# Patient Record
Sex: FEMALE | Race: WHITE | NOT HISPANIC OR LATINO | Employment: OTHER | URBAN - NONMETROPOLITAN AREA
[De-identification: names, ages, dates, MRNs, and addresses within clinical notes are randomized per-mention and may not be internally consistent; named-entity substitution may affect disease eponyms.]

---

## 2024-11-12 ENCOUNTER — OFFICE VISIT (OUTPATIENT)
Dept: SURGERY | Facility: CLINIC | Age: 54
End: 2024-11-12
Payer: COMMERCIAL

## 2024-11-12 ENCOUNTER — HOSPITAL ENCOUNTER (OUTPATIENT)
Dept: ULTRASOUND IMAGING | Facility: HOSPITAL | Age: 54
Discharge: HOME | End: 2024-11-12
Payer: COMMERCIAL

## 2024-11-12 VITALS
WEIGHT: 293 LBS | SYSTOLIC BLOOD PRESSURE: 104 MMHG | HEIGHT: 67 IN | BODY MASS INDEX: 45.99 KG/M2 | DIASTOLIC BLOOD PRESSURE: 72 MMHG

## 2024-11-12 DIAGNOSIS — K83.8 COMMON BILE DUCT DILATATION: ICD-10-CM

## 2024-11-12 DIAGNOSIS — R10.11 RUQ PAIN: Primary | ICD-10-CM

## 2024-11-12 DIAGNOSIS — R10.11 RUQ PAIN: ICD-10-CM

## 2024-11-12 DIAGNOSIS — K82.8 PORCELAIN GALLBLADDER: ICD-10-CM

## 2024-11-12 PROCEDURE — 76705 ECHO EXAM OF ABDOMEN: CPT | Performed by: RADIOLOGY

## 2024-11-12 PROCEDURE — 76705 ECHO EXAM OF ABDOMEN: CPT

## 2024-11-12 RX ORDER — ONDANSETRON 4 MG/1
TABLET, FILM COATED ORAL
COMMUNITY
Start: 2024-11-08

## 2024-11-12 RX ORDER — DICYCLOMINE HYDROCHLORIDE 10 MG/1
10 CAPSULE ORAL
COMMUNITY

## 2024-11-12 NOTE — PROGRESS NOTES
Subjective   Victoria Carlson is a 54 y.o. female who presents today for Initial Evaluation    Chief Complaint:    Chief Complaint   Patient presents with    Abdominal Pain        History of Present Illness:    History of Present Illness Victoria is a 54-year-old female who presents for evaluation for abdominal pain.  She reports approximately 3 weeks ago she developed right upper quadrant abdominal pain as well as occasional nausea, vomiting and diarrhea.  Reports that she was evaluated at [facility] and was told that her gallbladder was big.  They prescribed her Bentyl.  Suggested she follow-up with surgery.  Patient has not had any abdominal surgeries in the past.  She does not correlate her diet with any symptoms.  She reports that she has been pain-free over the past 2 to 3 days.  Reports currently her abdomen is nontender.  She denies any nausea, vomiting, fever or chills.  She does report she originally had a fever and chills when she presented to the emergency department at [facility].  She was prescribed Bentyl and states that it helped, however, it gives her a headache.    The following portions of the patient's history were reviewed and updated as appropriate: allergies, current medications, past family history, past medical history, past social history, past surgical history and problem list.    Past Medical History:  History reviewed. No pertinent past medical history.    Social History:  Social History     Socioeconomic History    Marital status:    Tobacco Use    Smoking status: Never     Passive exposure: Never    Smokeless tobacco: Never   Vaping Use    Vaping status: Never Used   Substance and Sexual Activity    Alcohol use: Yes     Alcohol/week: 2.0 standard drinks of alcohol     Types: 2 Drinks containing 0.5 oz of alcohol per week     Comment: bi-monthly    Drug use: Never    Sexual activity: Defer       Family History:  History reviewed. No pertinent family history.    Past Surgical  "History:  History reviewed. No pertinent surgical history.    Problem List:  There is no problem list on file for this patient.      Allergy:   No Known Allergies     Current Medications:   Current Outpatient Medications   Medication Sig Dispense Refill    ondansetron (ZOFRAN) 4 MG tablet TAKE ONE TABLET BY MOUTH EVERY 6 HOURS AS NEEDED FOR NAUSEA/VOMITING      dicyclomine (BENTYL) 10 MG capsule Take 1 capsule by mouth 4 (Four) Times a Day Before Meals & at Bedtime.       No current facility-administered medications for this visit.       Review of Systems:    Review of Systems   Gastrointestinal:  Positive for abdominal pain (resolved), nausea (resolved) and vomiting (resolved).     Physical Exam:   Physical Exam  Constitutional:       Appearance: Normal appearance.   HENT:      Head: Normocephalic and atraumatic.      Right Ear: External ear normal.      Left Ear: External ear normal.   Eyes:      Conjunctiva/sclera: Conjunctivae normal.   Pulmonary:      Effort: Pulmonary effort is normal.   Abdominal:      General: Abdomen is flat. There is no distension.      Tenderness: There is no abdominal tenderness.   Musculoskeletal:         General: Normal range of motion.      Cervical back: Normal range of motion and neck supple.   Skin:     General: Skin is warm and dry.      Capillary Refill: Capillary refill takes less than 2 seconds.   Neurological:      General: No focal deficit present.      Mental Status: She is alert and oriented to person, place, and time.   Psychiatric:         Mood and Affect: Mood normal.         Behavior: Behavior normal.         Vitals:  Blood pressure 104/72, height 170.2 cm (67\"), weight (!) 166 kg (367 lb).   Body mass index is 57.48 kg/m².     Labs: I have reviewed lab work from [facility] that was obtained on 11/8.  Patient's total bilirubin was normal at 0.7.  AST was normal at 26.  And ALT was normal at 22.    Imaging: I was able to review reports from [facility] " that revealed coarse calcification of the gallbladder fundus.    I have also reviewed ultrasound report that I ordered today.  The impression reveals a prominent common bile duct is measuring 1 cm in diameter, there is no bile duct stone seen.  Also revealed some calcifications within the gallbladder wall, representing a possible porcelain gallbladder.       Assessment & Plan   Diagnoses and all orders for this visit:    1. RUQ pain (Primary)  -     US Gallbladder; Future  -     Cancel: MRI abdomen wo contrast mrcp; Future  -     MRI abdomen w contrast mrcp; Future    2. Porcelain gallbladder  -     Cancel: MRI abdomen wo contrast mrcp; Future  -     MRI abdomen w contrast mrcp; Future    3. Common bile duct dilatation  -     Cancel: MRI abdomen wo contrast mrcp; Future  -     MRI abdomen w contrast mrcp; Future    Victoria is a 54-year-old female who presents for evaluation for right upper quadrant abdominal pain.  I did receive patient's CT imaging report after she left the clinic.  I sent her down for a ultrasound of her gallbladder.  It seems that patient more than likely has a porcelain gallbladder.  She does have a normal total bilirubin as well as normal LFTs on her most recent labs for around [facility] which were on 11/8.  I did discuss case with Dr. Toussaint who suggested performing MRCP to rule out stone in the common bile duct as her common bile duct is prominent and measuring 1 cm in diameter.  I called and discussed this with the patient, she verbalized understanding.  I will send MRCP ordered to [facility] as she lives in [place].  Once report is received we will discuss further plan of care.    Visit Diagnoses:    ICD-10-CM ICD-9-CM   1. RUQ pain  R10.11 789.01   2. Porcelain gallbladder  K82.8 575.8   3. Common bile duct dilatation  K83.8 576.8         MEDS ORDERED DURING VISIT:  No orders of the defined types were placed in this encounter.      Return in about 2 weeks  (around 11/26/2024).             This document has been electronically signed by MILAN Perez  November 12, 2024 14:28 EST    Please note that portions of this note were completed with a voice recognition program.

## 2024-12-10 ENCOUNTER — OFFICE VISIT (OUTPATIENT)
Age: 54
End: 2024-12-10
Payer: COMMERCIAL

## 2024-12-10 ENCOUNTER — HOSPITAL ENCOUNTER (OUTPATIENT)
Facility: HOSPITAL | Age: 54
Discharge: HOME | End: 2024-12-10
Admitting: SURGERY
Payer: COMMERCIAL

## 2024-12-10 VITALS — WEIGHT: 293 LBS | HEIGHT: 67 IN | BODY MASS INDEX: 45.99 KG/M2

## 2024-12-10 DIAGNOSIS — R10.30 LOWER ABDOMINAL PAIN: ICD-10-CM

## 2024-12-10 DIAGNOSIS — K80.20 GALLSTONES: Primary | ICD-10-CM

## 2024-12-10 DIAGNOSIS — K80.20 GALLSTONES: ICD-10-CM

## 2024-12-10 PROCEDURE — 1159F MED LIST DOCD IN RCRD: CPT | Performed by: SURGERY

## 2024-12-10 PROCEDURE — 74177 CT ABD & PELVIS W/CONTRAST: CPT

## 2024-12-10 PROCEDURE — 1160F RVW MEDS BY RX/DR IN RCRD: CPT | Performed by: SURGERY

## 2024-12-10 PROCEDURE — 25510000001 IOPAMIDOL 61 % SOLUTION: Performed by: SURGERY

## 2024-12-10 PROCEDURE — 99243 OFF/OP CNSLTJ NEW/EST LOW 30: CPT | Performed by: SURGERY

## 2024-12-10 PROCEDURE — 74177 CT ABD & PELVIS W/CONTRAST: CPT | Performed by: RADIOLOGY

## 2024-12-10 RX ORDER — NITROFURANTOIN 25; 75 MG/1; MG/1
1 CAPSULE ORAL EVERY 12 HOURS SCHEDULED
COMMUNITY
Start: 2024-12-05

## 2024-12-10 RX ORDER — PHENAZOPYRIDINE HYDROCHLORIDE 200 MG/1
TABLET, FILM COATED ORAL
COMMUNITY
Start: 2024-12-05

## 2024-12-10 RX ORDER — IOPAMIDOL 612 MG/ML
100 INJECTION, SOLUTION INTRAVASCULAR
Status: COMPLETED | OUTPATIENT
Start: 2024-12-10 | End: 2024-12-10

## 2024-12-10 RX ADMIN — IOPAMIDOL 100 ML: 612 INJECTION, SOLUTION INTRAVENOUS at 14:31

## 2024-12-11 PROBLEM — K80.20 GALLSTONES: Status: ACTIVE | Noted: 2024-12-11

## 2024-12-11 NOTE — PROGRESS NOTES
Subjective   Victoria Carlson is a 54 y.o. female is being seen for consultation today at the request of Rina Reid APRN    Victoria Carlson is a 54 y.o. female with abdominal pain that is upper and lower in nature.  No food association.  Calcifications of the gallbladder noted on ultrasound.  No constipation reported.  Patient has a body mass index of 57.48.  No current pain reported.    History of Present Illness      History reviewed. No pertinent past medical history.    Family History   Problem Relation Age of Onset    Arthritis Mother         Myself in my knees, back ,hands       Social History     Socioeconomic History    Marital status:    Tobacco Use    Smoking status: Never     Passive exposure: Never    Smokeless tobacco: Never   Vaping Use    Vaping status: Never Used   Substance and Sexual Activity    Alcohol use: Not Currently     Alcohol/week: 2.0 standard drinks of alcohol     Types: 2 Glasses of wine per week     Comment: On occasion once every 2 or 3 months    Drug use: Never    Sexual activity: Not Currently     Partners: Male     Birth control/protection: None       Past Surgical History:   Procedure Laterality Date    BREAST BIOPSY  1999    Left breast       Review of Systems   Constitutional:  Negative for activity change, appetite change, chills and fever.   HENT:  Negative for sore throat and trouble swallowing.    Eyes:  Negative for visual disturbance.   Respiratory:  Negative for cough and shortness of breath.    Cardiovascular:  Negative for chest pain and palpitations.   Gastrointestinal:  Positive for abdominal pain. Negative for abdominal distention, blood in stool, constipation, diarrhea, nausea and vomiting.   Endocrine: Negative for cold intolerance and heat intolerance.   Genitourinary:  Negative for dysuria.   Musculoskeletal:  Negative for joint swelling.   Skin:  Negative for color change, rash and wound.   Allergic/Immunologic: Negative for immunocompromised state.  "  Neurological:  Negative for dizziness, seizures, weakness and headaches.   Hematological:  Negative for adenopathy. Does not bruise/bleed easily.   Psychiatric/Behavioral:  Negative for agitation and confusion.        Results        Ht 170.2 cm (67\")   Wt (!) 166 kg (367 lb)   BMI 57.48 kg/m²   Objective   Physical Exam  Constitutional:       Appearance: She is well-developed.   HENT:      Head: Normocephalic and atraumatic.   Eyes:      Conjunctiva/sclera: Conjunctivae normal.      Pupils: Pupils are equal, round, and reactive to light.   Neck:      Thyroid: No thyromegaly.      Vascular: No JVD.      Trachea: No tracheal deviation.   Cardiovascular:      Rate and Rhythm: Normal rate and regular rhythm.      Heart sounds: No murmur heard.     No friction rub. No gallop.   Pulmonary:      Effort: Pulmonary effort is normal.      Breath sounds: Normal breath sounds.   Abdominal:      General: There is no distension.      Palpations: Abdomen is soft. There is no hepatomegaly or splenomegaly.      Tenderness: There is no abdominal tenderness.      Hernia: No hernia is present.   Musculoskeletal:         General: No deformity. Normal range of motion.      Cervical back: Neck supple.   Skin:     General: Skin is warm and dry.   Neurological:      Mental Status: She is alert and oriented to person, place, and time.       Physical Exam      Assessment & Plan            Assessment   Diagnoses and all orders for this visit:    1. Gallstones (Primary)  -     CT Abdomen Pelvis With Contrast; Future    2. Lower abdominal pain  -     CT Abdomen Pelvis With Contrast; Future        Assessment & Plan      Victoria Carlson is a 54 y.o. female with abdominal pain of uncertain etiology and possible gallstones on imaging.  Patient undergo CT of the abdomen pelvis to evaluate for lower abdominal pain source and reassess the gallbladder.  Follow-up after imaging.                 This document has been electronically signed by Barber" Stevie Pratt MD   December 11, 2024 11:57 EST

## 2024-12-17 ENCOUNTER — OFFICE VISIT (OUTPATIENT)
Dept: SURGERY | Facility: CLINIC | Age: 54
End: 2024-12-17
Payer: COMMERCIAL

## 2024-12-17 VITALS — HEIGHT: 67 IN | BODY MASS INDEX: 45.99 KG/M2 | WEIGHT: 293 LBS

## 2024-12-17 DIAGNOSIS — K80.20 GALLSTONES: Primary | ICD-10-CM

## 2024-12-17 PROCEDURE — 1159F MED LIST DOCD IN RCRD: CPT | Performed by: SURGERY

## 2024-12-17 PROCEDURE — 1160F RVW MEDS BY RX/DR IN RCRD: CPT | Performed by: SURGERY

## 2024-12-17 PROCEDURE — 99213 OFFICE O/P EST LOW 20 MIN: CPT | Performed by: SURGERY

## 2024-12-17 RX ORDER — INDOCYANINE GREEN AND WATER 25 MG
2.5 KIT INJECTION ONCE
OUTPATIENT
Start: 2024-12-17 | End: 2024-12-17

## 2024-12-17 RX ORDER — SODIUM CHLORIDE 0.9 % (FLUSH) 0.9 %
10 SYRINGE (ML) INJECTION AS NEEDED
OUTPATIENT
Start: 2024-12-17

## 2024-12-17 RX ORDER — SODIUM CHLORIDE 9 MG/ML
40 INJECTION, SOLUTION INTRAVENOUS AS NEEDED
OUTPATIENT
Start: 2024-12-17

## 2024-12-17 RX ORDER — ACETAMINOPHEN 325 MG/1
650 TABLET ORAL ONCE
OUTPATIENT
Start: 2024-12-17 | End: 2024-12-17

## 2024-12-17 RX ORDER — SODIUM CHLORIDE 0.9 % (FLUSH) 0.9 %
3 SYRINGE (ML) INJECTION EVERY 12 HOURS SCHEDULED
OUTPATIENT
Start: 2024-12-17

## 2024-12-17 NOTE — PROGRESS NOTES
Subjective   Victoria Carlson is a 54 y.o. female is being seen for consultation today at the request of Rina Reid APRN    Victoria Carlson is a 54 y.o. female with abdominal pain that is upper and lower in nature.  No food association.  Calcifications of the gallbladder noted on ultrasound.  No constipation reported.  Patient has a body mass index of 57.48.  No current pain reported.    History of Present Illness      History reviewed. No pertinent past medical history.    Family History   Problem Relation Age of Onset    Arthritis Mother         Myself in my knees, back ,hands       Social History     Socioeconomic History    Marital status:    Tobacco Use    Smoking status: Never     Passive exposure: Never    Smokeless tobacco: Never   Vaping Use    Vaping status: Never Used   Substance and Sexual Activity    Alcohol use: Not Currently     Alcohol/week: 2.0 standard drinks of alcohol     Types: 2 Glasses of wine per week     Comment: On occasion once every 2 or 3 months    Drug use: Never    Sexual activity: Not Currently     Partners: Male     Birth control/protection: None       Past Surgical History:   Procedure Laterality Date    BREAST BIOPSY  1999    Left breast       Review of Systems   Constitutional:  Negative for activity change, appetite change, chills and fever.   HENT:  Negative for sore throat and trouble swallowing.    Eyes:  Negative for visual disturbance.   Respiratory:  Negative for cough and shortness of breath.    Cardiovascular:  Negative for chest pain and palpitations.   Gastrointestinal:  Positive for abdominal pain. Negative for abdominal distention, blood in stool, constipation, diarrhea, nausea and vomiting.   Endocrine: Negative for cold intolerance and heat intolerance.   Genitourinary:  Negative for dysuria.   Musculoskeletal:  Negative for joint swelling.   Skin:  Negative for color change, rash and wound.   Allergic/Immunologic: Negative for immunocompromised state.  "  Neurological:  Negative for dizziness, seizures, weakness and headaches.   Hematological:  Negative for adenopathy. Does not bruise/bleed easily.   Psychiatric/Behavioral:  Negative for agitation and confusion.        Results        Ht 170.2 cm (67.01\")   Wt (!) 166 kg (367 lb)   BMI 57.47 kg/m²   Objective   Physical Exam  Constitutional:       Appearance: She is well-developed.   HENT:      Head: Normocephalic and atraumatic.   Eyes:      Conjunctiva/sclera: Conjunctivae normal.      Pupils: Pupils are equal, round, and reactive to light.   Neck:      Thyroid: No thyromegaly.      Vascular: No JVD.      Trachea: No tracheal deviation.   Cardiovascular:      Rate and Rhythm: Normal rate and regular rhythm.      Heart sounds: No murmur heard.     No friction rub. No gallop.   Pulmonary:      Effort: Pulmonary effort is normal.      Breath sounds: Normal breath sounds.   Abdominal:      General: There is no distension.      Palpations: Abdomen is soft. There is no hepatomegaly or splenomegaly.      Tenderness: There is no abdominal tenderness.      Hernia: No hernia is present.   Musculoskeletal:         General: No deformity. Normal range of motion.      Cervical back: Neck supple.   Skin:     General: Skin is warm and dry.   Neurological:      Mental Status: She is alert and oriented to person, place, and time.       Physical Exam      Assessment & Plan            Assessment   Diagnoses and all orders for this visit:    1. Gallstones (Primary)  -     Case Request; Standing  -     sodium chloride 0.9 % flush 3 mL  -     sodium chloride 0.9 % flush 10 mL  -     sodium chloride 0.9 % infusion 40 mL  -     acetaminophen (TYLENOL) tablet 650 mg  -     indocyanine green (IC-GREEN) injection 2.5 mg  -     Case Request    Other orders  -     Follow Anesthesia Guidelines / Protocol; Future  -     Follow Anesthesia Guidelines / Protocol; Standing  -     Verify / Perform Chlorhexidine Skin Prep; Standing  -     Provide " NPO Instructions to Patient; Future  -     Chlorhexidine Skin Prep; Future  -     Insert Peripheral IV; Standing  -     Saline Lock & Maintain IV Access; Standing  -     Place Sequential Compression Device; Standing  -     Maintain Sequential Compression Device; Standing        Assessment & Plan      Victoria Carlson is a 54 y.o. female with abdominal pain of uncertain etiology and possible gallstones on imaging.  CT scan negative for any source of other abnormality.  Patient will undergo robotic cholecystectomy and understands the risks and benefits of the procedure.               This document has been electronically signed by Barber Pratt MD   December 17, 2024 13:11 EST

## 2024-12-27 ENCOUNTER — TELEPHONE (OUTPATIENT)
Dept: SURGERY | Facility: CLINIC | Age: 54
End: 2024-12-27
Payer: COMMERCIAL

## 2024-12-27 NOTE — TELEPHONE ENCOUNTER
You are scheduled for surgery with Dr. Pratt on 1/2/25 at 8:00am.   Do not eat/drink anything after midnight the night prior to surgery, and you must have a  present with you on day of surgery.  An appointment for pre-op has been scheduled for 12/31/24 at 1:00pm. This is a visit with surgical nurses and the anesthesia team to draw blood work and review your medical history and current medications.  Arrive at outpatient surgery on the ground floor of the hospital both of these days. Outpatient surgery is located on the opposite side of the ER location. Follow the arrows for surgery on the [facility] signs posted along the [facility] hill. If you have any questions please call the office at [phone].        Left message for patient to return my call

## 2025-01-17 ENCOUNTER — TELEPHONE (OUTPATIENT)
Dept: SURGERY | Facility: CLINIC | Age: 55
End: 2025-01-17
Payer: COMMERCIAL

## 2025-01-31 ENCOUNTER — DOCUMENTATION (OUTPATIENT)
Dept: SURGERY | Facility: CLINIC | Age: 55
End: 2025-01-31
Payer: COMMERCIAL

## 2025-01-31 NOTE — PROGRESS NOTES
You are scheduled for surgery with Dr. Pratt on 2/6/25 at 9:15am.   Do not eat/drink anything after midnight the night prior to surgery, and you must have a  present with you on day of surgery.  An appointment for pre-op has been scheduled for 2/4/25 at 10:15am. This is a visit with surgical nurses and the anesthesia team to draw blood work and review your medical history and current medications.  Arrive at outpatient surgery on the ground floor of the hospital both of these days. Outpatient surgery is located on the opposite side of the ER location. Follow the arrows for surgery on the [facility] signs posted along the [facility] hill. If you have any questions please call the office at [phone].

## 2025-02-03 NOTE — DISCHARGE INSTRUCTIONS
Please discontinue all blood thinners and anticoagulants (except aspirin) prior to surgery as per your surgeon and cardiologist instructions.  Aspirin may be continued up to the day prior to surgery.    HOLD all diabetic medications the morning of surgery as order by physician.    Please follow instructions on use of prep cloths provided by nurse. Return instruction sheet to pre-op nurse on day of surgery.    Arrival time for surgery on 2/6/25  will be given to you by Dr. Pratt's Office.(0915 AM)    A RESPONSIBLE PERSON MUST REMAIN IN THE WAITING ROOM DURING YOUR PROCEDURE AND A RESPONSIBLE  MUST BE AVAILABLE UPON YOUR DISCHARGE.    General Instructions:  Do NOT eat or drink after midnight 2/5/25 which includes water, mints, or gum.  You may brush your teeth. Dental appliances that are removable must be taken out day of surgery.  Do NOT smoke, chew tobacco, or drink alcohol within 24 hours prior to surgery.  Bring medications in original bottles, any inhalers and if applicable your C-PAP/BI-PAP machine  Bring any papers given to you in the doctor’s office  Wear clean, comfortable clothes and socks  Do NOT wear contact lenses or make-up or dark nail polish.  Bring a case for your glasses if applicable.  Bring crutches or walker if applicable  Leave all other valuables and jewelry at home  If you were given a blood bank armband, continue to wear it until discharged.    Preventing a Surgical Site Infection:  Shower the night before surgery (unless instructed otherwise) using a fresh bar of anti-bacterial soap (such as Dial) and clean washcloth.  Dry with a clean towel and dress in clean clothing.  For 2 to 3 days before surgery, avoid shaving with a razor near where you will have surgery because the razor can irritate skin and make it easier to develop an infection.  Ask your surgeon if you will be receiving antibiotics prior to surgery.  Make sure you, your family, and all healthcare providers clean their hands  with soap and water or an alcohol-based hand  before caring for you or your wound.  If at all possible, quit smoking as many days before surgery as you can.    Day of Surgery:  Upon arrival, a pre-op nurse and anesthesiologist will review your health history, obtain vital signs, and answer questions you may have.  The only belongings needed at this time will be your home medications and if applicable you C-PAP/BI-PAP machine.  If you are staying overnight, your family can leave the rest of your belongings in the car and bring them to your room later.  A pre-op nurse will start an IV and you may receive medication in preparation for surgery.  Due to patient privacy and limited space, only one member of your family will be able to accompany you in the pre-op area.  While you are in surgery your family should notify the waiting room  if they leave the waiting room area and provide a contact number.  Please be aware that surgery does come with discomfort.  We want to make every effort to control your discomfort so please discuss any uncontrolled symptoms with your nurse.  Your doctor will most likely have prescribed pain medications.  If you are going home after surgery you will receive individualized written care instructions before being discharged.  A responsible adult must drive you to and from the hospital on the day of surgery and stay with you for 24 hours.  If you are staying overnight following surgery, you will be transported to your hospital room following the recovery period.

## 2025-02-04 ENCOUNTER — PRE-ADMISSION TESTING (OUTPATIENT)
Dept: PREADMISSION TESTING | Facility: HOSPITAL | Age: 55
End: 2025-02-04
Payer: COMMERCIAL

## 2025-02-04 LAB
ANION GAP SERPL CALCULATED.3IONS-SCNC: 10.7 MMOL/L (ref 5–15)
BUN SERPL-MCNC: 15 MG/DL (ref 6–20)
BUN/CREAT SERPL: 21.4 (ref 7–25)
CALCIUM SPEC-SCNC: 9.1 MG/DL (ref 8.6–10.5)
CHLORIDE SERPL-SCNC: 103 MMOL/L (ref 98–107)
CO2 SERPL-SCNC: 28.3 MMOL/L (ref 22–29)
CREAT SERPL-MCNC: 0.7 MG/DL (ref 0.57–1)
DEPRECATED RDW RBC AUTO: 43 FL (ref 37–54)
EGFRCR SERPLBLD CKD-EPI 2021: 101.6 ML/MIN/1.73
ERYTHROCYTE [DISTWIDTH] IN BLOOD BY AUTOMATED COUNT: 13.1 % (ref 12.3–15.4)
GLUCOSE SERPL-MCNC: 102 MG/DL (ref 65–99)
HCT VFR BLD AUTO: 43.3 % (ref 34–46.6)
HGB BLD-MCNC: 13.2 G/DL (ref 12–15.9)
MCH RBC QN AUTO: 27.7 PG (ref 26.6–33)
MCHC RBC AUTO-ENTMCNC: 30.5 G/DL (ref 31.5–35.7)
MCV RBC AUTO: 91 FL (ref 79–97)
PLATELET # BLD AUTO: 325 10*3/MM3 (ref 140–450)
PMV BLD AUTO: 9.6 FL (ref 6–12)
POTASSIUM SERPL-SCNC: 3.8 MMOL/L (ref 3.5–5.2)
RBC # BLD AUTO: 4.76 10*6/MM3 (ref 3.77–5.28)
SODIUM SERPL-SCNC: 142 MMOL/L (ref 136–145)
WBC NRBC COR # BLD AUTO: 11.21 10*3/MM3 (ref 3.4–10.8)

## 2025-02-04 PROCEDURE — 36415 COLL VENOUS BLD VENIPUNCTURE: CPT

## 2025-02-04 PROCEDURE — 85027 COMPLETE CBC AUTOMATED: CPT

## 2025-02-04 PROCEDURE — 80048 BASIC METABOLIC PNL TOTAL CA: CPT

## 2025-02-06 ENCOUNTER — APPOINTMENT (OUTPATIENT)
Dept: GENERAL RADIOLOGY | Facility: HOSPITAL | Age: 55
End: 2025-02-06
Payer: COMMERCIAL

## 2025-02-06 ENCOUNTER — HOSPITAL ENCOUNTER (OUTPATIENT)
Facility: HOSPITAL | Age: 55
Setting detail: HOSPITAL OUTPATIENT SURGERY
Discharge: HOME | End: 2025-02-06
Attending: SURGERY | Admitting: SURGERY
Payer: COMMERCIAL

## 2025-02-06 ENCOUNTER — ANESTHESIA (OUTPATIENT)
Dept: PERIOP | Facility: HOSPITAL | Age: 55
End: 2025-02-06
Payer: COMMERCIAL

## 2025-02-06 ENCOUNTER — ANESTHESIA EVENT (OUTPATIENT)
Dept: PERIOP | Facility: HOSPITAL | Age: 55
End: 2025-02-06
Payer: COMMERCIAL

## 2025-02-06 VITALS
HEART RATE: 64 BPM | BODY MASS INDEX: 45.99 KG/M2 | RESPIRATION RATE: 18 BRPM | TEMPERATURE: 97.8 F | HEIGHT: 67 IN | OXYGEN SATURATION: 95 % | WEIGHT: 293 LBS | DIASTOLIC BLOOD PRESSURE: 73 MMHG | SYSTOLIC BLOOD PRESSURE: 118 MMHG

## 2025-02-06 DIAGNOSIS — K80.20 GALLSTONES: ICD-10-CM

## 2025-02-06 PROCEDURE — 25010000002 DEXAMETHASONE PER 1 MG: Performed by: NURSE ANESTHETIST, CERTIFIED REGISTERED

## 2025-02-06 PROCEDURE — 25010000002 LIDOCAINE PF 2% 2 % SOLUTION: Performed by: NURSE ANESTHETIST, CERTIFIED REGISTERED

## 2025-02-06 PROCEDURE — 25010000002 NEOSTIGMINE 10 MG/10ML SOLUTION: Performed by: NURSE ANESTHETIST, CERTIFIED REGISTERED

## 2025-02-06 PROCEDURE — S0260 H&P FOR SURGERY: HCPCS | Performed by: SURGERY

## 2025-02-06 PROCEDURE — 25010000002 FENTANYL CITRATE (PF) 50 MCG/ML SOLUTION: Performed by: NURSE ANESTHETIST, CERTIFIED REGISTERED

## 2025-02-06 PROCEDURE — 25010000002 GLYCOPYRROLATE 0.4 MG/2ML SOLUTION: Performed by: NURSE ANESTHETIST, CERTIFIED REGISTERED

## 2025-02-06 PROCEDURE — 25010000002 ONDANSETRON PER 1 MG: Performed by: NURSE ANESTHETIST, CERTIFIED REGISTERED

## 2025-02-06 PROCEDURE — 25010000002 INDOCYANINE GREEN 25 MG RECONSTITUTED SOLUTION: Performed by: SURGERY

## 2025-02-06 PROCEDURE — 49320 DIAG LAPARO SEPARATE PROC: CPT | Performed by: SURGERY

## 2025-02-06 PROCEDURE — 25010000002 KETOROLAC TROMETHAMINE PER 15 MG: Performed by: NURSE ANESTHETIST, CERTIFIED REGISTERED

## 2025-02-06 PROCEDURE — 25010000002 BUPRENORPHINE PER 0.1 MG: Performed by: NURSE ANESTHETIST, CERTIFIED REGISTERED

## 2025-02-06 PROCEDURE — 25010000002 ROPIVACAINE PER 1 MG: Performed by: NURSE ANESTHETIST, CERTIFIED REGISTERED

## 2025-02-06 PROCEDURE — 25010000002 PROPOFOL 200 MG/20ML EMULSION: Performed by: NURSE ANESTHETIST, CERTIFIED REGISTERED

## 2025-02-06 PROCEDURE — 25010000002 MIDAZOLAM PER 1 MG: Performed by: NURSE ANESTHETIST, CERTIFIED REGISTERED

## 2025-02-06 PROCEDURE — 25010000002 CEFAZOLIN PER 500 MG: Performed by: SURGERY

## 2025-02-06 DEVICE — HEMOLOK L 6 CLIPS/CART
Type: IMPLANTABLE DEVICE | Site: ABDOMEN | Status: FUNCTIONAL
Brand: WECK

## 2025-02-06 RX ORDER — MEPERIDINE HYDROCHLORIDE 25 MG/ML
12.5 INJECTION INTRAMUSCULAR; INTRAVENOUS; SUBCUTANEOUS
Status: DISCONTINUED | OUTPATIENT
Start: 2025-02-06 | End: 2025-02-06 | Stop reason: HOSPADM

## 2025-02-06 RX ORDER — SODIUM CHLORIDE 0.9 % (FLUSH) 0.9 %
10 SYRINGE (ML) INJECTION AS NEEDED
Status: DISCONTINUED | OUTPATIENT
Start: 2025-02-06 | End: 2025-02-06 | Stop reason: HOSPADM

## 2025-02-06 RX ORDER — ROPIVACAINE HYDROCHLORIDE 5 MG/ML
INJECTION, SOLUTION EPIDURAL; INFILTRATION; PERINEURAL
Status: COMPLETED | OUTPATIENT
Start: 2025-02-06 | End: 2025-02-06

## 2025-02-06 RX ORDER — MIDAZOLAM HYDROCHLORIDE 1 MG/ML
1 INJECTION, SOLUTION INTRAMUSCULAR; INTRAVENOUS
Status: DISCONTINUED | OUTPATIENT
Start: 2025-02-06 | End: 2025-02-06 | Stop reason: HOSPADM

## 2025-02-06 RX ORDER — BUPRENORPHINE HYDROCHLORIDE 0.32 MG/ML
INJECTION INTRAMUSCULAR; INTRAVENOUS
Status: COMPLETED | OUTPATIENT
Start: 2025-02-06 | End: 2025-02-06

## 2025-02-06 RX ORDER — FAMOTIDINE 10 MG/ML
INJECTION, SOLUTION INTRAVENOUS AS NEEDED
Status: DISCONTINUED | OUTPATIENT
Start: 2025-02-06 | End: 2025-02-06 | Stop reason: SURG

## 2025-02-06 RX ORDER — GLYCOPYRROLATE 0.2 MG/ML
INJECTION INTRAMUSCULAR; INTRAVENOUS AS NEEDED
Status: DISCONTINUED | OUTPATIENT
Start: 2025-02-06 | End: 2025-02-06 | Stop reason: SURG

## 2025-02-06 RX ORDER — ACETAMINOPHEN 325 MG/1
650 TABLET ORAL EVERY 4 HOURS PRN
Qty: 30 TABLET | Refills: 0 | Status: SHIPPED | OUTPATIENT
Start: 2025-02-06

## 2025-02-06 RX ORDER — SODIUM CHLORIDE 0.9 % (FLUSH) 0.9 %
10 SYRINGE (ML) INJECTION EVERY 12 HOURS SCHEDULED
Status: DISCONTINUED | OUTPATIENT
Start: 2025-02-06 | End: 2025-02-06 | Stop reason: HOSPADM

## 2025-02-06 RX ORDER — TRAMADOL HYDROCHLORIDE 50 MG/1
50 TABLET ORAL EVERY 6 HOURS PRN
Qty: 12 TABLET | Refills: 0 | Status: SHIPPED | OUTPATIENT
Start: 2025-02-06

## 2025-02-06 RX ORDER — FENTANYL CITRATE 50 UG/ML
50 INJECTION, SOLUTION INTRAMUSCULAR; INTRAVENOUS
Status: DISCONTINUED | OUTPATIENT
Start: 2025-02-06 | End: 2025-02-06 | Stop reason: HOSPADM

## 2025-02-06 RX ORDER — MIDAZOLAM HYDROCHLORIDE 1 MG/ML
INJECTION, SOLUTION INTRAMUSCULAR; INTRAVENOUS AS NEEDED
Status: DISCONTINUED | OUTPATIENT
Start: 2025-02-06 | End: 2025-02-06 | Stop reason: SURG

## 2025-02-06 RX ORDER — SODIUM CHLORIDE 9 MG/ML
40 INJECTION, SOLUTION INTRAVENOUS AS NEEDED
Status: DISCONTINUED | OUTPATIENT
Start: 2025-02-06 | End: 2025-02-06 | Stop reason: HOSPADM

## 2025-02-06 RX ORDER — DEXAMETHASONE SODIUM PHOSPHATE 4 MG/ML
INJECTION, SOLUTION INTRA-ARTICULAR; INTRALESIONAL; INTRAMUSCULAR; INTRAVENOUS; SOFT TISSUE
Status: COMPLETED | OUTPATIENT
Start: 2025-02-06 | End: 2025-02-06

## 2025-02-06 RX ORDER — IBUPROFEN 600 MG/1
600 TABLET, FILM COATED ORAL EVERY 6 HOURS PRN
Qty: 30 TABLET | Refills: 0 | Status: SHIPPED | OUTPATIENT
Start: 2025-02-06

## 2025-02-06 RX ORDER — SODIUM CHLORIDE 0.9 % (FLUSH) 0.9 %
3 SYRINGE (ML) INJECTION EVERY 12 HOURS SCHEDULED
Status: DISCONTINUED | OUTPATIENT
Start: 2025-02-06 | End: 2025-02-06 | Stop reason: HOSPADM

## 2025-02-06 RX ORDER — ONDANSETRON 2 MG/ML
4 INJECTION INTRAMUSCULAR; INTRAVENOUS AS NEEDED
Status: DISCONTINUED | OUTPATIENT
Start: 2025-02-06 | End: 2025-02-06 | Stop reason: HOSPADM

## 2025-02-06 RX ORDER — NEOSTIGMINE METHYLSULFATE 1 MG/ML
INJECTION INTRAVENOUS AS NEEDED
Status: DISCONTINUED | OUTPATIENT
Start: 2025-02-06 | End: 2025-02-06 | Stop reason: SURG

## 2025-02-06 RX ORDER — ACETAMINOPHEN 325 MG/1
650 TABLET ORAL ONCE
Status: COMPLETED | OUTPATIENT
Start: 2025-02-06 | End: 2025-02-06

## 2025-02-06 RX ORDER — IPRATROPIUM BROMIDE AND ALBUTEROL SULFATE 2.5; .5 MG/3ML; MG/3ML
3 SOLUTION RESPIRATORY (INHALATION) ONCE AS NEEDED
Status: DISCONTINUED | OUTPATIENT
Start: 2025-02-06 | End: 2025-02-06 | Stop reason: HOSPADM

## 2025-02-06 RX ORDER — INDOCYANINE GREEN AND WATER 25 MG
2.5 KIT INJECTION ONCE
Status: COMPLETED | OUTPATIENT
Start: 2025-02-06 | End: 2025-02-06

## 2025-02-06 RX ORDER — SODIUM CHLORIDE, SODIUM LACTATE, POTASSIUM CHLORIDE, CALCIUM CHLORIDE 600; 310; 30; 20 MG/100ML; MG/100ML; MG/100ML; MG/100ML
125 INJECTION, SOLUTION INTRAVENOUS ONCE
Status: DISCONTINUED | OUTPATIENT
Start: 2025-02-06 | End: 2025-02-06 | Stop reason: HOSPADM

## 2025-02-06 RX ORDER — LIDOCAINE HYDROCHLORIDE 20 MG/ML
INJECTION, SOLUTION EPIDURAL; INFILTRATION; INTRACAUDAL; PERINEURAL AS NEEDED
Status: DISCONTINUED | OUTPATIENT
Start: 2025-02-06 | End: 2025-02-06 | Stop reason: SURG

## 2025-02-06 RX ORDER — SODIUM CHLORIDE, SODIUM LACTATE, POTASSIUM CHLORIDE, CALCIUM CHLORIDE 600; 310; 30; 20 MG/100ML; MG/100ML; MG/100ML; MG/100ML
100 INJECTION, SOLUTION INTRAVENOUS ONCE AS NEEDED
Status: DISCONTINUED | OUTPATIENT
Start: 2025-02-06 | End: 2025-02-06 | Stop reason: HOSPADM

## 2025-02-06 RX ORDER — FENTANYL CITRATE 50 UG/ML
INJECTION, SOLUTION INTRAMUSCULAR; INTRAVENOUS AS NEEDED
Status: DISCONTINUED | OUTPATIENT
Start: 2025-02-06 | End: 2025-02-06 | Stop reason: SURG

## 2025-02-06 RX ORDER — ROCURONIUM BROMIDE 10 MG/ML
INJECTION, SOLUTION INTRAVENOUS AS NEEDED
Status: DISCONTINUED | OUTPATIENT
Start: 2025-02-06 | End: 2025-02-06 | Stop reason: SURG

## 2025-02-06 RX ORDER — ONDANSETRON 2 MG/ML
INJECTION INTRAMUSCULAR; INTRAVENOUS AS NEEDED
Status: DISCONTINUED | OUTPATIENT
Start: 2025-02-06 | End: 2025-02-06 | Stop reason: SURG

## 2025-02-06 RX ORDER — OXYCODONE AND ACETAMINOPHEN 5; 325 MG/1; MG/1
1 TABLET ORAL ONCE AS NEEDED
Status: COMPLETED | OUTPATIENT
Start: 2025-02-06 | End: 2025-02-06

## 2025-02-06 RX ORDER — KETOROLAC TROMETHAMINE 30 MG/ML
30 INJECTION, SOLUTION INTRAMUSCULAR; INTRAVENOUS EVERY 6 HOURS PRN
Status: COMPLETED | OUTPATIENT
Start: 2025-02-06 | End: 2025-02-06

## 2025-02-06 RX ORDER — PROPOFOL 10 MG/ML
INJECTION, EMULSION INTRAVENOUS AS NEEDED
Status: DISCONTINUED | OUTPATIENT
Start: 2025-02-06 | End: 2025-02-06 | Stop reason: SURG

## 2025-02-06 RX ADMIN — FAMOTIDINE 20 MG: 10 INJECTION, SOLUTION INTRAVENOUS at 09:38

## 2025-02-06 RX ADMIN — MIDAZOLAM HYDROCHLORIDE 2 MG: 1 INJECTION, SOLUTION INTRAMUSCULAR; INTRAVENOUS at 09:38

## 2025-02-06 RX ADMIN — ONDANSETRON 4 MG: 2 INJECTION INTRAMUSCULAR; INTRAVENOUS at 10:29

## 2025-02-06 RX ADMIN — ROCURONIUM BROMIDE 35 MG: 10 SOLUTION INTRAVENOUS at 09:43

## 2025-02-06 RX ADMIN — FENTANYL CITRATE 50 MCG: 50 INJECTION, SOLUTION INTRAMUSCULAR; INTRAVENOUS at 11:14

## 2025-02-06 RX ADMIN — FENTANYL CITRATE 100 MCG: 50 INJECTION INTRAMUSCULAR; INTRAVENOUS at 09:43

## 2025-02-06 RX ADMIN — NEOSTIGMINE METHYLSULFATE 2 MG: 0.5 INJECTION INTRAVENOUS at 10:35

## 2025-02-06 RX ADMIN — CEFAZOLIN 2000 MG: 2 INJECTION, POWDER, FOR SOLUTION INTRAMUSCULAR; INTRAVENOUS at 09:38

## 2025-02-06 RX ADMIN — GLYCOPYRROLATE 0.4 MG: 0.2 INJECTION INTRAMUSCULAR; INTRAVENOUS at 10:35

## 2025-02-06 RX ADMIN — PROPOFOL 200 MG: 10 INJECTION, EMULSION INTRAVENOUS at 09:43

## 2025-02-06 RX ADMIN — MIDAZOLAM HYDROCHLORIDE 2 MG: 1 INJECTION, SOLUTION INTRAMUSCULAR; INTRAVENOUS at 09:43

## 2025-02-06 RX ADMIN — GLYCOPYRROLATE 0.4 MG: 0.2 INJECTION INTRAMUSCULAR; INTRAVENOUS at 10:29

## 2025-02-06 RX ADMIN — ROPIVACAINE HYDROCHLORIDE 300 MG: 5 INJECTION, SOLUTION EPIDURAL; INFILTRATION; PERINEURAL at 09:53

## 2025-02-06 RX ADMIN — DEXAMETHASONE SODIUM PHOSPHATE 8 MG: 4 INJECTION, SOLUTION INTRA-ARTICULAR; INTRALESIONAL; INTRAMUSCULAR; INTRAVENOUS; SOFT TISSUE at 09:53

## 2025-02-06 RX ADMIN — OXYCODONE HYDROCHLORIDE AND ACETAMINOPHEN 1 TABLET: 5; 325 TABLET ORAL at 11:38

## 2025-02-06 RX ADMIN — NEOSTIGMINE METHYLSULFATE 3 MG: 0.5 INJECTION INTRAVENOUS at 10:29

## 2025-02-06 RX ADMIN — INDOCYANINE GREEN AND WATER 2.5 MG: KIT at 09:02

## 2025-02-06 RX ADMIN — ACETAMINOPHEN 650 MG: 325 TABLET ORAL at 09:02

## 2025-02-06 RX ADMIN — LIDOCAINE HYDROCHLORIDE 60 MG: 20 INJECTION, SOLUTION EPIDURAL; INFILTRATION; INTRACAUDAL; PERINEURAL at 09:43

## 2025-02-06 RX ADMIN — BUPRENORPHINE HYDROCHLORIDE 0.3 MG: 0.32 INJECTION INTRAMUSCULAR; INTRAVENOUS at 09:53

## 2025-02-06 RX ADMIN — KETOROLAC TROMETHAMINE 30 MG: 30 INJECTION, SOLUTION INTRAMUSCULAR; INTRAVENOUS at 11:08

## 2025-02-06 NOTE — ANESTHESIA PROCEDURE NOTES
"Peripheral Block      Patient reassessed immediately prior to procedure    Patient location during procedure: OR  Start time: 2/6/2025 9:51 AM  Stop time: 2/6/2025 9:53 AM  Reason for block: at surgeon's request and post-op pain management  Performed by  CRNA/CAA: Radha Sánchez CRNA  Preanesthetic Checklist  Completed: patient identified, IV checked, site marked, risks and benefits discussed, surgical consent, monitors and equipment checked, pre-op evaluation and timeout performed  Prep:  Pt Position: supine  Sterile barriers:cap, gloves, sterile barriers and mask  Prep: ChloraPrep  Patient monitoring: blood pressure monitoring, continuous pulse oximetry and EKG  Procedure    Sedation: yes (Sedation, GA, Spinal,Epidural )  Performed under: general  Guidance:ultrasound guided    ULTRASOUND INTERPRETATION.  Using ultrasound guidance a 20 G (20g 4\" Stimuplex) gauge needle was placed in close proximity to the nerve, at which point, under ultrasound guidance anesthetic was injected in the area of the nerve and spread of the anesthesia was seen on ultrasound in close proximity thereto.  There were no abnormalities seen on ultrasound; a digital image was taken; and the patient tolerated the procedure with no complications. Images:still images obtained, printed/placed on chart    Laterality:Bilateral  Block Type:TAP  Injection Technique:single-shot  Needle Type:short-bevel  Needle Gauge:20 G  Resistance on Injection: none    Medications Used: buprenorphine (BUPRENEX) injection - Injection, Transversus Abdominus Plane   0.3 mg - 2/6/2025 9:53:00 AM  dexamethasone (DECADRON) injection - Injection, Transversus Abdominus Plane   8 mg - 2/6/2025 9:53:00 AM  ropivacaine (NAROPIN) injection 0.5 % - Peripheral Nerve, Transversus Abdominus Plane   300 mg - 2/6/2025 9:53:00 AM      Medications  Comment:Block Injection:  Total volume divided equally between Right and Left block      Post Assessment  Injection Assessment: " negative aspiration for heme, incremental injection and no paresthesia on injection  Patient Tolerance:comfortable throughout block  Complications:no  Additional Notes  The pt was in the supine position under general anesthesia    Under Ultrasound guidance, a Hurst 4inch 360 degree needle was advanced with Normal Saline hydro dissection of tissue.  The Rectus and Transversus Abdominus muscles where visualized.  The needle tip was placed between the Transversus Abdominus and rectus abdominus, local anesthetic spread was visualized in the Transversus Abdominus Plane. Injection was made incrementally with aspiration every 5 mls.  There was no  intravascular injection,  injection pressure was normal, there was no neural injection, and the procedure was completed without difficulty. The same procedure was completed for left and right sided subcostal tap blocks. Thank You.      Performed by: Radha Sánchez CRNA

## 2025-02-06 NOTE — OP NOTE
DIAGNOSTIC LAPAROSCOPY  Procedure Note    Victoria Carlson  2/6/2025    Pre-op Diagnosis:   Gallstones [K80.20]    Post-op Diagnosis:     Post-Op Diagnosis Codes:     * Gallstones [K80.20]    Procedure(s):  DIAGNOSTIC LAPAROSCOPY    Surgeon(s):  Barber Pratt MD    Anesthesia: GET    Staff:   Patienceulator: Kennedi Odonnell RN  Scrub Person: Henrique Galvan  Assistant: Mora Guerin    Findings: Dense inflammatory change and adhesions with nodular appearance of the fundus of the gallbladder that was visualized.  Given the selective calcifications on imaging in the dense inflammatory reaction at this location and the nodular appearance it was felt that aborting the procedure was the safest maneuver so the patient can be referred to hepatobiliary surgery in the instance she would require more extensive surgery for gallbladder cancer or just given her overall size and difficulty of the case be at a facility where hepatobiliary surgery available.          Operative Procedure: Patient was taken operating suite placed upon operating table.  Bilateral sequential compression devices were in place and general endotracheal anesthesia was administered.  Transversus block was performed anesthesia.  The abdomen was prepped and draped in usual sterile fashion.  Timeout procedure was performed.  Through a 8 mm incision in the Chambers's point gasless entry into the abdomen was performed and pneumoperitoneum was established.  Laparoscopic survey showed no evidence of injury from entry.  There were dense omental adhesions to the undersurface of the gallbladder.  Supraumbilical 8 mm trocar was placed into right abdominal robotic trocars were placed.  The robot was docked to the patient and I exited the sterile field and into the robotic console.  There were dense omental adhesions to the fundus of the gallbladder and a small portion of the fundus was visible above these adhesions and appeared nodular but no metastatic liver  lesions were noted.  Attempts to dissected the plane between the densely adherent transverse colon and fundus were difficult and given the dense nature of the inflammatory change and the selective calcifications noted on CT scan concern for possible gallbladder cancer was noted.  There were no significant lesions to biopsy without possibly entering the gallbladder which would commit to a larger surgery and therefore biopsies were not obtained.  Given the patient's super morbid obesity of a BMI of 58 it was felt that she would benefit from either reattempted laparoscopic or open cholecystectomy at a facility where hepatobiliary surgery is available due to the potential difficulties with the case or need for more extensive resection if malignancy present.  At this time the field was irrigated and all irrigant was suctioned free.  The area was hemostatic and the procedure was aborted.  The supraumbilical fascial defect was closed with a Isauro-Roger device using a 0 Vicryl suture and all trocars were removed and pneumoperitoneum evacuated.  Skin incisions were closed with Monocryl subcuticular suture and skin affix.  The patient was awakened from anesthesia and taken to recovery.    Estimated Blood Loss: 5 mL    Specimens:   None           Drains: None    Grafts/Implants: None     Complications: None           Barber Pratt MD     Date: 2/6/2025  Time: 10:44 EST

## 2025-02-06 NOTE — ANESTHESIA PROCEDURE NOTES
Airway  Urgency: elective    Date/Time: 2/6/2025 9:43 AM  Airway not difficult    General Information and Staff    Patient location during procedure: OR  Anesthesiologist: Kev Chandler DO  CRNA/CAA: Radha Sánchez CRNA    Indications and Patient Condition  Indications for airway management: airway protection    Preoxygenated: yes  MILS maintained throughout  Mask difficulty assessment: 2 - vent by mask + OA or adjuvant +/- NMBA    Final Airway Details  Final airway type: endotracheal airway      Successful airway: ETT  Cuffed: yes   Successful intubation technique: direct laryngoscopy  Endotracheal tube insertion site: oral  Blade: Fletcher  Blade size: 3  ETT size (mm): 7.0  Cormack-Lehane Classification: grade IIa - partial view of glottis  Placement verified by: chest auscultation, capnometry and palpation of cuff   Cuff volume (mL): 6  Measured from: lips  ETT/EBT  to lips (cm): 21  Number of attempts at approach: 1  Assessment: lips, teeth, and gum same as pre-op and atraumatic intubation    Additional Comments  Dentition as preop. No complications noted. Patient tolerated well.  ETT secured.

## 2025-02-06 NOTE — H&P
Subjective  Victoria Carlson is a 54 y.o. female is being seen for consultation today at the request of Rina Reid APRN     Victoria Carlson is a 54 y.o. female with abdominal pain that is upper and lower in nature.  No food association.  Calcifications of the gallbladder noted on ultrasound.  No constipation reported.  Patient has a body mass index of 57.48.  No current pain reported.     History of Present Illness        Medical History   History reviewed. No pertinent past medical history.              Family History   Problem Relation Age of Onset    Arthritis Mother           Myself in my knees, back ,hands         Social History   Social History            Socioeconomic History    Marital status:    Tobacco Use    Smoking status: Never       Passive exposure: Never    Smokeless tobacco: Never   Vaping Use    Vaping status: Never Used   Substance and Sexual Activity    Alcohol use: Not Currently       Alcohol/week: 2.0 standard drinks of alcohol       Types: 2 Glasses of wine per week       Comment: On occasion once every 2 or 3 months    Drug use: Never    Sexual activity: Not Currently       Partners: Male       Birth control/protection: None            Surgical History         Past Surgical History:   Procedure Laterality Date    BREAST BIOPSY   1999     Left breast            Review of Systems   Constitutional:  Negative for activity change, appetite change, chills and fever.   HENT:  Negative for sore throat and trouble swallowing.    Eyes:  Negative for visual disturbance.   Respiratory:  Negative for cough and shortness of breath.    Cardiovascular:  Negative for chest pain and palpitations.   Gastrointestinal:  Positive for abdominal pain. Negative for abdominal distention, blood in stool, constipation, diarrhea, nausea and vomiting.   Endocrine: Negative for cold intolerance and heat intolerance.   Genitourinary:  Negative for dysuria.   Musculoskeletal:  Negative for joint swelling.   Skin:   "Negative for color change, rash and wound.   Allergic/Immunologic: Negative for immunocompromised state.   Neurological:  Negative for dizziness, seizures, weakness and headaches.   Hematological:  Negative for adenopathy. Does not bruise/bleed easily.   Psychiatric/Behavioral:  Negative for agitation and confusion.          Results           Ht 170.2 cm (67.01\")   Wt (!) 166 kg (367 lb)   BMI 57.47 kg/m²      Objective  Physical Exam  Constitutional:       Appearance: She is well-developed.   HENT:      Head: Normocephalic and atraumatic.   Eyes:      Conjunctiva/sclera: Conjunctivae normal.      Pupils: Pupils are equal, round, and reactive to light.   Neck:      Thyroid: No thyromegaly.      Vascular: No JVD.      Trachea: No tracheal deviation.   Cardiovascular:      Rate and Rhythm: Normal rate and regular rhythm.      Heart sounds: No murmur heard.     No friction rub. No gallop.   Pulmonary:      Effort: Pulmonary effort is normal.      Breath sounds: Normal breath sounds.   Abdominal:      General: There is no distension.      Palpations: Abdomen is soft. There is no hepatomegaly or splenomegaly.      Tenderness: There is no abdominal tenderness.      Hernia: No hernia is present.   Musculoskeletal:         General: No deformity. Normal range of motion.      Cervical back: Neck supple.   Skin:     General: Skin is warm and dry.   Neurological:      Mental Status: She is alert and oriented to person, place, and time.         Physical Exam        Assessment & Plan                    Assessment  Diagnoses and all orders for this visit:     1. Gallstones (Primary)  -     Case Request; Standing  -     sodium chloride 0.9 % flush 3 mL  -     sodium chloride 0.9 % flush 10 mL  -     sodium chloride 0.9 % infusion 40 mL  -     acetaminophen (TYLENOL) tablet 650 mg  -     indocyanine green (IC-GREEN) injection 2.5 mg  -     Case Request     Other orders  -     Follow Anesthesia Guidelines / Protocol; Future  -     " Follow Anesthesia Guidelines / Protocol; Standing  -     Verify / Perform Chlorhexidine Skin Prep; Standing  -     Provide NPO Instructions to Patient; Future  -     Chlorhexidine Skin Prep; Future  -     Insert Peripheral IV; Standing  -     Saline Lock & Maintain IV Access; Standing  -     Place Sequential Compression Device; Standing  -     Maintain Sequential Compression Device; Standing           Assessment & Plan        Victoria Carlson is a 54 y.o. female with abdominal pain of uncertain etiology and possible gallstones on imaging.  CT scan negative for any source of other abnormality.  Patient will undergo robotic cholecystectomy and understands the risks and benefits of the procedure.

## 2025-02-06 NOTE — ANESTHESIA POSTPROCEDURE EVALUATION
Patient: Victoria Carlson    Procedure Summary       Date: 02/06/25 Room / Location:  [facility] 04 /  [facility]    Anesthesia Start: 0938 Anesthesia Stop: 1059    Procedure: DIAGNOSTIC LAPAROSCOPY (Abdomen) Diagnosis:       Gallstones      (Gallstones [K80.20])    Surgeons: Barber Pratt MD Provider: Kev Chandler DO    Anesthesia Type: general ASA Status: 3            Anesthesia Type: general    Vitals  Vitals Value Taken Time   /61 02/06/25 1130   Temp 97.6 °F (36.4 °C) 02/06/25 1055   Pulse 56 02/06/25 1131   Resp 15 02/06/25 1130   SpO2 89 % 02/06/25 1131   Vitals shown include unfiled device data.        Post Anesthesia Care and Evaluation    Patient location during evaluation: PHASE II  Patient participation: complete - patient participated  Level of consciousness: awake and alert  Pain score: 1  Pain management: adequate    Airway patency: patent  Anesthetic complications: No anesthetic complications  PONV Status: controlled  Cardiovascular status: acceptable  Respiratory status: acceptable  Hydration status: acceptable

## 2025-02-06 NOTE — ANESTHESIA PREPROCEDURE EVALUATION
Anesthesia Evaluation     Patient summary reviewed and Nursing notes reviewed   no history of anesthetic complications:   NPO Solid Status: > 8 hours  NPO Liquid Status: > 8 hours           Airway   Mallampati: III  TM distance: >3 FB  Neck ROM: full  Possible difficult intubation and Small opening  Dental - normal exam   (+) poor dentition    Pulmonary    (+) ,shortness of breath, decreased breath sounds  Cardiovascular - normal exam  Exercise tolerance: poor (<4 METS)    Rhythm: regular  Rate: normal        Neuro/Psych  GI/Hepatic/Renal/Endo    (+) morbid obesity    Musculoskeletal     Abdominal   (+) obese    Bowel sounds: normal.   Substance History      OB/GYN          Other   arthritis,         Phys Exam Other: No upper teeth.  Lower teeth are all chipped and broken with multiple missing and decayed.                Anesthesia Plan    ASA 3     general     intravenous induction     Anesthetic plan, risks, benefits, and alternatives have been provided, discussed and informed consent has been obtained with: patient.    CODE STATUS:

## 2025-02-18 ENCOUNTER — OFFICE VISIT (OUTPATIENT)
Dept: SURGERY | Facility: CLINIC | Age: 55
End: 2025-02-18
Payer: COMMERCIAL

## 2025-02-18 VITALS — BODY MASS INDEX: 58.42 KG/M2 | HEIGHT: 67 IN

## 2025-02-18 DIAGNOSIS — K80.20 GALLSTONES: Primary | ICD-10-CM

## 2025-02-18 PROCEDURE — 99024 POSTOP FOLLOW-UP VISIT: CPT | Performed by: SURGERY

## 2025-02-18 PROCEDURE — 1159F MED LIST DOCD IN RCRD: CPT | Performed by: SURGERY

## 2025-02-18 PROCEDURE — 1160F RVW MEDS BY RX/DR IN RCRD: CPT | Performed by: SURGERY

## 2025-02-18 NOTE — PROGRESS NOTES
Subjective   Victoria Carlson is a 54 y.o. female  is here today for follow-up.         Victoria Carlson is a 54 y.o. female here for follow-up after attempted cholecystectomy for right upper quadrant pain and gallstones.  The patient had severe inflammatory change to the fundus of the gallbladder and given her large size and surgery being performed electively conversion to open or significant dissection with risk of bowel injury or bleeding was felt to be unsafe and the procedure was aborted.  She is here for follow-up and her incisions are healing well.  No significant right upper quadrant pain currently.  History of Present Illness         Physical Exam  Abdominal incisions healing well  Physical Exam              Assessment     Diagnoses and all orders for this visit:    1. Gallstones (Primary)      Victoria Carlson is a 54 y.o. female with gallstones but possible severe inflammatory change and adhesions that may require open cholecystectomy and given the calcifications in the fundus that may represent a stone underlying malignancy is not excluded.  Will obtain further imaging with MRI and follow-up after imaging to discuss further management.  Assessment & Plan          This document has been electronically signed by Barber Pratt MD   February 18, 2025 13:58 EST

## 2025-03-11 ENCOUNTER — HOSPITAL ENCOUNTER (OUTPATIENT)
Dept: MRI IMAGING | Facility: HOSPITAL | Age: 55
Discharge: HOME | End: 2025-03-11
Admitting: SURGERY
Payer: COMMERCIAL

## 2025-03-11 DIAGNOSIS — K80.20 GALLSTONES: ICD-10-CM

## 2025-03-11 PROCEDURE — 74181 MRI ABDOMEN W/O CONTRAST: CPT

## 2025-04-22 ENCOUNTER — TELEPHONE (OUTPATIENT)
Dept: SURGERY | Facility: CLINIC | Age: 55
End: 2025-04-22
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
## 2025-04-30 ENCOUNTER — OFFICE VISIT (OUTPATIENT)
Dept: SURGERY | Facility: CLINIC | Age: 55
End: 2025-04-30
Payer: COMMERCIAL

## 2025-04-30 VITALS — BODY MASS INDEX: 45.99 KG/M2 | HEIGHT: 67 IN | WEIGHT: 293 LBS

## 2025-04-30 DIAGNOSIS — K80.20 GALLSTONES: Primary | ICD-10-CM

## 2025-04-30 PROCEDURE — 99024 POSTOP FOLLOW-UP VISIT: CPT | Performed by: SURGERY

## 2025-04-30 PROCEDURE — 1160F RVW MEDS BY RX/DR IN RCRD: CPT | Performed by: SURGERY

## 2025-04-30 PROCEDURE — 1159F MED LIST DOCD IN RCRD: CPT | Performed by: SURGERY

## 2025-05-01 NOTE — PROGRESS NOTES
Subjective   Victoria Carlson is a 54 y.o. female  is here today for follow-up.         Victoria Carlson is a 54 y.o. female here for follow-up after attempted cholecystectomy for right upper quadrant pain and gallstones.  The patient had severe inflammatory change to the fundus of the gallbladder and given her large size and surgery being performed electively conversion to open or significant dissection with risk of bowel injury or bleeding was felt to be unsafe and the procedure was aborted.  She is here for follow-up and her incisions are healing well.  No significant right upper quadrant pain currently.  MRCP showed no evidence of malignancy but there is shadowing proximal to the area that appears to be the dome of the gallbladder that may be this area of inflammatory mass that was noted intraoperatively.  History of Present Illness         Physical Exam  Abdominal incisions healing well  Physical Exam              Assessment     Diagnoses and all orders for this visit:    1. Gallstones (Primary)      Victoria Carlson is a 54 y.o. female with gallstones but possible severe inflammatory change and adhesions that may require open cholecystectomy and given the calcifications in the fundus that may represent a stone underlying malignancy is not excluded.  While MRI shows no significant findings apart from gallstones due to the inflammatory nature I believe there is an area of 1 to 2 cm above the maximal visualized area of contrast-filled gallbladder but may represent mass that was causing the dense inflammatory change.  Given her morbid obesity and this inflammatory change I feel she would best served for surgery at a tertiary care center with hepatobiliary surgery available.  She will be referred to the [facility] for assessment and evaluation.  Assessment & Plan          This document has been electronically signed by Barber Pratt MD   May 1, 2025 10:55 EDT

## 2025-05-09 ENCOUNTER — DOCUMENTATION (OUTPATIENT)
Dept: SURGERY | Facility: CLINIC | Age: 55
End: 2025-05-09
Payer: COMMERCIAL

## (undated) DEVICE — TUBING, SUCTION, 1/4" X 20', STRAIGHT: Brand: MEDLINE INDUSTRIES, INC.

## (undated) DEVICE — 2, DISPOSABLE SUCTION/IRRIGATOR WITH DISPOSABLE TIP: Brand: STRYKEFLOW

## (undated) DEVICE — CANNULA SEAL

## (undated) DEVICE — 40595 XL TRENDELENBURG POSITIONING KIT: Brand: 40595 XL TRENDELENBURG POSITIONING KIT

## (undated) DEVICE — GLV SURG PREMIERPRO MIC LTX PF SZ7.5 BRN

## (undated) DEVICE — ENDOPATH XCEL BLADELESS TROCARS WITH STABILITY SLEEVES: Brand: ENDOPATH XCEL

## (undated) DEVICE — SUT MNCRYL 4/0 PS2 18 IN

## (undated) DEVICE — INSUFFLATION NEEDLE TO ESTABLISH PNEUMOPERITONEUM.: Brand: INSUFFLATION NEEDLE

## (undated) DEVICE — PERMANENT CAUTERY HOOK: Brand: ENDOWRIST

## (undated) DEVICE — UNDERGLV SURG BIOGEL INDICAT PF 8 GRN

## (undated) DEVICE — LARGE HEM-O-LOK CLIP APPLIER: Brand: ENDOWRIST

## (undated) DEVICE — CVR DISP HUG U VAC STEEP TREND

## (undated) DEVICE — NDL HYPO ECLPS SFTY 18G 1 1/2IN

## (undated) DEVICE — SHEAR HARM ENDOWRIST DAVINCI/X/XI BXLR FEN 8 REUS

## (undated) DEVICE — PK LAP GEN 70

## (undated) DEVICE — BLADELESS OBTURATOR: Brand: WECK VISTA

## (undated) DEVICE — SUT VIC 0/0 UR6 27IN DYED J603H

## (undated) DEVICE — COVER,MAYO STAND,STERILE: Brand: MEDLINE

## (undated) DEVICE — ENDOPATH XCEL UNIVERSAL TROCAR STABLILITY SLEEVES: Brand: ENDOPATH XCEL

## (undated) DEVICE — MONOPOLAR METZENBAUM SCISSOR TIP, DISPOSABLE: Brand: MONOPOLAR METZENBAUM SCISSOR TIP, DISPOSABLE

## (undated) DEVICE — Device

## (undated) DEVICE — ARM DRAPE

## (undated) DEVICE — GLV SURG PREMIERPRO MIC LTX PF SZ8 BRN

## (undated) DEVICE — PAD GRND REM POLYHESIVE A/ DISP

## (undated) DEVICE — CADIERE FORCEPS: Brand: ENDOWRIST

## (undated) DEVICE — PAD POSTN ARMBND 1P/U